# Patient Record
Sex: FEMALE
[De-identification: names, ages, dates, MRNs, and addresses within clinical notes are randomized per-mention and may not be internally consistent; named-entity substitution may affect disease eponyms.]

---

## 2024-07-01 ENCOUNTER — APPOINTMENT (OUTPATIENT)
Dept: ORTHOPEDIC SURGERY | Facility: CLINIC | Age: 54
End: 2024-07-01

## 2024-07-01 PROBLEM — Z00.00 ENCOUNTER FOR PREVENTIVE HEALTH EXAMINATION: Status: ACTIVE | Noted: 2024-07-01

## 2024-07-03 ENCOUNTER — APPOINTMENT (OUTPATIENT)
Dept: ORTHOPEDIC SURGERY | Facility: CLINIC | Age: 54
End: 2024-07-03
Payer: COMMERCIAL

## 2024-07-03 PROCEDURE — 99203 OFFICE O/P NEW LOW 30 MIN: CPT | Mod: 25

## 2024-07-19 ENCOUNTER — APPOINTMENT (OUTPATIENT)
Dept: ORTHOPEDIC SURGERY | Facility: CLINIC | Age: 54
End: 2024-07-19

## 2024-07-19 DIAGNOSIS — S82.64XA NONDISPLACED FRACTURE OF LATERAL MALLEOLUS OF RIGHT FIBULA, INITIAL ENCOUNTER FOR CLOSED FRACTURE: ICD-10-CM

## 2024-07-19 PROCEDURE — 99204 OFFICE O/P NEW MOD 45 MIN: CPT | Mod: 57

## 2024-07-19 PROCEDURE — 27786 TREATMENT OF ANKLE FRACTURE: CPT | Mod: RT

## 2024-07-19 PROCEDURE — 73610 X-RAY EXAM OF ANKLE: CPT | Mod: RT

## 2024-07-19 PROCEDURE — L1902: CPT | Mod: RT

## 2024-07-21 PROBLEM — S82.64XA CLOSED NONDISPLACED FRACTURE OF LATERAL MALLEOLUS OF RIGHT FIBULA, INITIAL ENCOUNTER: Status: ACTIVE | Noted: 2024-07-03

## 2024-07-31 ENCOUNTER — NON-APPOINTMENT (OUTPATIENT)
Age: 54
End: 2024-07-31

## 2024-07-31 ENCOUNTER — APPOINTMENT (OUTPATIENT)
Dept: CARDIOLOGY | Facility: CLINIC | Age: 54
End: 2024-07-31

## 2024-07-31 VITALS
HEIGHT: 63 IN | BODY MASS INDEX: 28 KG/M2 | SYSTOLIC BLOOD PRESSURE: 108 MMHG | WEIGHT: 158 LBS | DIASTOLIC BLOOD PRESSURE: 78 MMHG

## 2024-07-31 VITALS — HEART RATE: 103 BPM

## 2024-07-31 DIAGNOSIS — Z00.00 ENCOUNTER FOR GENERAL ADULT MEDICAL EXAMINATION W/OUT ABNORMAL FINDINGS: ICD-10-CM

## 2024-07-31 DIAGNOSIS — Z13.6 ENCOUNTER FOR SCREENING FOR CARDIOVASCULAR DISORDERS: ICD-10-CM

## 2024-07-31 PROCEDURE — 99204 OFFICE O/P NEW MOD 45 MIN: CPT | Mod: 25

## 2024-07-31 PROCEDURE — 93000 ELECTROCARDIOGRAM COMPLETE: CPT | Mod: NC

## 2024-07-31 NOTE — DISCUSSION/SUMMARY
[FreeTextEntry1] : At the present time, the patient is asymptomatic in terms of cardiac disease. She is preop for surgery. This is low risk surgery. No further cardiac workup necessary.

## 2024-07-31 NOTE — HISTORY OF PRESENT ILLNESS
[FreeTextEntry1] : Patient has a negative cardiovascular history in the past. She denies risk factors for ischemic heart disease. There is no evidence of exertional chest pain, exertional shortness of breath or manifestations of atherosclerotic heart disease. She denies any history of heart murmurs, palpitations, presyncope or syncope. There is no evidence of valvular heart disease in this particular patient. She denies symptoms of exertional shortness of breath, congestive heart failure or pedal edema. There is no evidence of significant hypertensive heart disease. She is on no medications.  EKG is normal sinus rhythm

## 2024-08-16 ENCOUNTER — NON-APPOINTMENT (OUTPATIENT)
Age: 54
End: 2024-08-16

## 2024-08-16 ENCOUNTER — APPOINTMENT (OUTPATIENT)
Dept: ORTHOPEDIC SURGERY | Facility: CLINIC | Age: 54
End: 2024-08-16
Payer: OTHER MISCELLANEOUS

## 2024-08-16 ENCOUNTER — RESULT CHARGE (OUTPATIENT)
Age: 54
End: 2024-08-16

## 2024-08-16 DIAGNOSIS — S82.64XA NONDISPLACED FRACTURE OF LATERAL MALLEOLUS OF RIGHT FIBULA, INITIAL ENCOUNTER FOR CLOSED FRACTURE: ICD-10-CM

## 2024-08-16 PROCEDURE — 73610 X-RAY EXAM OF ANKLE: CPT | Mod: RT

## 2024-08-16 PROCEDURE — 99203 OFFICE O/P NEW LOW 30 MIN: CPT

## 2024-08-16 NOTE — HISTORY OF PRESENT ILLNESS
[de-identified] : 54-year-old patient comes to see me for follow-up of her right ankle.  She is doing better.  She still notes some pain localized over the distal tip of the fibula.  She has some swelling.  Does not endorse any calf pain chest pain or shortness of breath.

## 2024-08-16 NOTE — PHYSICAL EXAM
[de-identified] : She is tender over the lateral malleolus.  She has pain with passive inversion.  This pain is improved from her prior exam.  She has full range of motion.  She is neurovascular intact distally.

## 2024-08-16 NOTE — DATA REVIEWED
[FreeTextEntry1] : 3 views of the patient's right ankle ordered reviewed by me personally.  X-rays demonstrate evidence of a nondisplaced avulsion fracture of the distal fibula which appears to be stable.

## 2024-08-16 NOTE — DISCUSSION/SUMMARY
[de-identified] : At this point I would like the patient to wean out of the brace.  She will continue with activity as tolerated.  She can return to work without restrictions on September 3, 2024.  Until then she is under percent temporarily disabled.

## 2024-10-14 ENCOUNTER — APPOINTMENT (OUTPATIENT)
Dept: ORTHOPEDIC SURGERY | Facility: CLINIC | Age: 54
End: 2024-10-14

## 2024-10-23 ENCOUNTER — APPOINTMENT (OUTPATIENT)
Dept: ORTHOPEDIC SURGERY | Facility: CLINIC | Age: 54
End: 2024-10-23